# Patient Record
Sex: FEMALE | Race: ASIAN | NOT HISPANIC OR LATINO | URBAN - METROPOLITAN AREA
[De-identification: names, ages, dates, MRNs, and addresses within clinical notes are randomized per-mention and may not be internally consistent; named-entity substitution may affect disease eponyms.]

---

## 2022-02-10 ENCOUNTER — OFFICE VISIT (OUTPATIENT)
Dept: URBAN - METROPOLITAN AREA CLINIC 78 | Facility: CLINIC | Age: 62
End: 2022-02-10
Payer: COMMERCIAL

## 2022-02-10 ENCOUNTER — LAB OUTSIDE AN ENCOUNTER (OUTPATIENT)
Dept: URBAN - METROPOLITAN AREA CLINIC 78 | Facility: CLINIC | Age: 62
End: 2022-02-10

## 2022-02-10 VITALS
HEART RATE: 63 BPM | BODY MASS INDEX: 17.75 KG/M2 | SYSTOLIC BLOOD PRESSURE: 121 MMHG | HEIGHT: 64 IN | TEMPERATURE: 97.9 F | DIASTOLIC BLOOD PRESSURE: 73 MMHG | WEIGHT: 104 LBS

## 2022-02-10 DIAGNOSIS — R68.81 EARLY SATIETY: ICD-10-CM

## 2022-02-10 DIAGNOSIS — K30 INDIGESTION: ICD-10-CM

## 2022-02-10 DIAGNOSIS — K58.1 IRRITABLE BOWEL SYNDROME WITH CONSTIPATION: ICD-10-CM

## 2022-02-10 PROCEDURE — 3017F COLORECTAL CA SCREEN DOC REV: CPT | Performed by: INTERNAL MEDICINE

## 2022-02-10 PROCEDURE — G8427 DOCREV CUR MEDS BY ELIG CLIN: HCPCS | Performed by: INTERNAL MEDICINE

## 2022-02-10 PROCEDURE — 1036F TOBACCO NON-USER: CPT | Performed by: INTERNAL MEDICINE

## 2022-02-10 PROCEDURE — 99204 OFFICE O/P NEW MOD 45 MIN: CPT | Performed by: INTERNAL MEDICINE

## 2022-02-10 PROCEDURE — G8420 CALC BMI NORM PARAMETERS: HCPCS | Performed by: INTERNAL MEDICINE

## 2022-02-10 PROCEDURE — G9903 PT SCRN TBCO ID AS NON USER: HCPCS | Performed by: INTERNAL MEDICINE

## 2022-02-10 PROCEDURE — G9622 NO UNHEAL ETOH USER: HCPCS | Performed by: INTERNAL MEDICINE

## 2022-02-10 RX ORDER — MIRTAZAPINE 15 MG/1
1 TABLET AT BEDTIME TABLET, FILM COATED ORAL ONCE A DAY
Qty: 30 | Refills: 2 | OUTPATIENT
Start: 2022-02-10

## 2022-02-10 RX ORDER — OMEPRAZOLE 40 MG/1
1 CAPSULE 30 MINUTES BEFORE MORNING MEAL CAPSULE, DELAYED RELEASE ORAL ONCE A DAY
Status: ACTIVE | COMMUNITY

## 2022-02-10 RX ORDER — ROSUVASTATIN CALCIUM 5 MG/1
1 TABLET TABLET, FILM COATED ORAL ONCE A DAY
Status: ACTIVE | COMMUNITY

## 2022-02-10 NOTE — PREVIOUS WORKUP REVIEWED
.ENDOSCOPIES-EGD 7/21/2021: Indication of epigastric pain.  Localized erythema over the mucosa in the duodenal bulb.  Otherwise normal EGD.-Colonoscopy 7/21/2021: Normal TI and colon. LABSIMAGES-MRI abdomen with and without contrast 3/15/2021: Indication epigastric, lower back pain.  History of hepatic cysts.  Scattered simple appearing cysts in the liver.  Largest 1/2.6 cm.  No worrisome lesion.  No significant fat deposition.  S/p cholecystectomy.  Normal bile duct.  No evidence of choledocholithiasis.  Normal pancreas.  Status post left nephrectomy.  1.1 cm cyst in the right-sided kidney.  Colonic fecal loading.  Patent abdominal vasculature.

## 2022-02-10 NOTE — HPI-TODAY'S VISIT:
61-year-old [place] American female presents for 7 years a history of indigestion, early satiety, chest/epigastric/upper back pain postprandially, weight loss.  She had comprehensive work-up with Dr. Tru Levy including EGD, colonoscopy, MRI.  Unremarkable. She moves bowel every other day.  MRI showed increase in stool load.  She does not like taking medication. History of H. pylori, treated.  This time H. pylori was negative per patient's report. She has been on various PPI, currently omeprazole.  Typically works initially then slowly become ineffective.

## 2022-03-08 ENCOUNTER — OFFICE VISIT (OUTPATIENT)
Dept: URBAN - METROPOLITAN AREA CLINIC 78 | Facility: CLINIC | Age: 62
End: 2022-03-08
Payer: COMMERCIAL

## 2022-03-08 VITALS
DIASTOLIC BLOOD PRESSURE: 73 MMHG | TEMPERATURE: 97.6 F | SYSTOLIC BLOOD PRESSURE: 110 MMHG | HEART RATE: 68 BPM | WEIGHT: 104 LBS | HEIGHT: 64 IN | BODY MASS INDEX: 17.75 KG/M2

## 2022-03-08 DIAGNOSIS — R68.81 EARLY SATIETY: ICD-10-CM

## 2022-03-08 DIAGNOSIS — K58.1 IRRITABLE BOWEL SYNDROME WITH CONSTIPATION: ICD-10-CM

## 2022-03-08 DIAGNOSIS — R10.13 ABDOMINAL PAIN, EPIGASTRIC: ICD-10-CM

## 2022-03-08 PROCEDURE — G8427 DOCREV CUR MEDS BY ELIG CLIN: HCPCS | Performed by: INTERNAL MEDICINE

## 2022-03-08 PROCEDURE — 1036F TOBACCO NON-USER: CPT | Performed by: INTERNAL MEDICINE

## 2022-03-08 PROCEDURE — G9622 NO UNHEAL ETOH USER: HCPCS | Performed by: INTERNAL MEDICINE

## 2022-03-08 PROCEDURE — 99214 OFFICE O/P EST MOD 30 MIN: CPT | Performed by: INTERNAL MEDICINE

## 2022-03-08 PROCEDURE — 3017F COLORECTAL CA SCREEN DOC REV: CPT | Performed by: INTERNAL MEDICINE

## 2022-03-08 PROCEDURE — G8420 CALC BMI NORM PARAMETERS: HCPCS | Performed by: INTERNAL MEDICINE

## 2022-03-08 RX ORDER — MIRTAZAPINE 15 MG/1
1 TABLET AT BEDTIME TABLET, FILM COATED ORAL ONCE A DAY
Qty: 30 | Refills: 2 | Status: ACTIVE | COMMUNITY
Start: 2022-02-10

## 2022-03-08 RX ORDER — OMEPRAZOLE 40 MG/1
1 CAPSULE 30 MINUTES BEFORE MORNING MEAL CAPSULE, DELAYED RELEASE ORAL ONCE A DAY
Status: ACTIVE | COMMUNITY

## 2022-03-08 RX ORDER — METOCLOPRAMIDE HYDROCHLORIDE 5 MG/1
1 TABLET BEFORE MEALS AND BEDTIME TABLET ORAL
Qty: 56 | Refills: 2 | OUTPATIENT

## 2022-03-08 RX ORDER — ROSUVASTATIN CALCIUM 5 MG/1
1 TABLET TABLET, FILM COATED ORAL ONCE A DAY
Status: ACTIVE | COMMUNITY

## 2022-03-08 NOTE — HPI-TODAY'S VISIT:
61-year-old female presents for follow-up of [place] satiety, chronic indigestion, IBS-C.  She has been taking MiraLAX daily, " a little bit".  She most bowel every day, small amount.  [place] Stool Scale 1 or 4. She did not have gastric emptying study done with concern for radiation exposure. She did not take Remeron with concern for side effect. She does not want to try " antidepressant" or getting past with x-ray. Her pancreatic enzyme, stool elastase last year was normal.

## 2022-03-08 NOTE — PREVIOUS WORKUP REVIEWED
. , .ENDOSCOPIES-EGD 7/21/2021: Localized erythema of the mucosa in the duodenal bulb. 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 No dysplasia. Negative for H. pylori. LABSIMAGES-MRI abdomen with and without contrast 3/15/2021: Indication epigastric, lower back pain. History of hepatic cysts. Scattered simple appearing cysts in the liver. Largest 1/2.6 cm. No worrisome lesion. No significant fat deposition. S/p cholecystectomy. Normal bile duct. No evidence of choledocholithiasis. Normal pancreas. Status post left nephrectomy. 1.1 cm cyst in the right-sided kidney. Colonic fecal loading. Patent abdominal vasculature.

## 2022-03-31 ENCOUNTER — APPOINTMENT (RX ONLY)
Dept: URBAN - METROPOLITAN AREA CLINIC 45 | Facility: CLINIC | Age: 62
Setting detail: DERMATOLOGY
End: 2022-03-31

## 2022-03-31 DIAGNOSIS — L72.8 OTHER FOLLICULAR CYSTS OF THE SKIN AND SUBCUTANEOUS TISSUE: ICD-10-CM | Status: INADEQUATELY CONTROLLED

## 2022-03-31 DIAGNOSIS — D22 MELANOCYTIC NEVI: ICD-10-CM

## 2022-03-31 PROBLEM — D22.62 MELANOCYTIC NEVI OF LEFT UPPER LIMB, INCLUDING SHOULDER: Status: ACTIVE | Noted: 2022-03-31

## 2022-03-31 PROCEDURE — ? FULL BODY SKIN EXAM - DECLINED

## 2022-03-31 PROCEDURE — ? OBSERVATION AND MEASURE

## 2022-03-31 PROCEDURE — ? COUNSELING

## 2022-03-31 PROCEDURE — ? ADDITIONAL NOTES

## 2022-03-31 PROCEDURE — 99202 OFFICE O/P NEW SF 15 MIN: CPT

## 2022-03-31 ASSESSMENT — LOCATION SIMPLE DESCRIPTION DERM
LOCATION SIMPLE: LEFT CHEEK
LOCATION SIMPLE: LEFT ELBOW

## 2022-03-31 ASSESSMENT — LOCATION DETAILED DESCRIPTION DERM
LOCATION DETAILED: LEFT INFERIOR CENTRAL MALAR CHEEK
LOCATION DETAILED: LEFT ELBOW

## 2022-03-31 ASSESSMENT — LOCATION ZONE DERM
LOCATION ZONE: ARM
LOCATION ZONE: FACE

## 2022-03-31 NOTE — HPI: SKIN LESION
What Type Of Note Output Would You Prefer (Optional)?: Bullet Format
How Severe Is Your Skin Lesion?: mild
Has Your Skin Lesion Been Treated?: not been treated
Is This A New Presentation, Or A Follow-Up?: Growth
Additional History: Pt has a skin lesion on her left cheek, mentioned she has gotten it drained before but not removed.. \\nnew pt to PMM

## 2022-03-31 NOTE — PROCEDURE: ADDITIONAL NOTES
Render Risk Assessment In Note?: no
Detail Level: Simple
Additional Notes: Pt was referred to Dr Antonina gonzalez , plastic surgeon to get evaluated for the scarring on the cyst etc.
Additional Notes: Patient consent was obtained to proceed with the visit and recommended plan of care after discussion of all risks and benefits, including the risks of COVID-19 exposure.

## 2022-04-19 ENCOUNTER — OFFICE VISIT (OUTPATIENT)
Dept: URBAN - METROPOLITAN AREA CLINIC 78 | Facility: CLINIC | Age: 62
End: 2022-04-19
Payer: COMMERCIAL

## 2022-04-19 ENCOUNTER — DASHBOARD ENCOUNTERS (OUTPATIENT)
Age: 62
End: 2022-04-19

## 2022-04-19 VITALS
TEMPERATURE: 98.4 F | HEART RATE: 62 BPM | HEIGHT: 64 IN | BODY MASS INDEX: 17.75 KG/M2 | SYSTOLIC BLOOD PRESSURE: 123 MMHG | RESPIRATION RATE: 16 BRPM | WEIGHT: 104 LBS | DIASTOLIC BLOOD PRESSURE: 74 MMHG

## 2022-04-19 DIAGNOSIS — R68.81 EARLY SATIETY: ICD-10-CM

## 2022-04-19 DIAGNOSIS — K58.1 IRRITABLE BOWEL SYNDROME WITH CONSTIPATION: ICD-10-CM

## 2022-04-19 DIAGNOSIS — K30 INDIGESTION: ICD-10-CM

## 2022-04-19 PROBLEM — 440630006: Status: ACTIVE | Noted: 2022-02-10

## 2022-04-19 PROBLEM — 3696007: Status: ACTIVE | Noted: 2022-02-10

## 2022-04-19 PROBLEM — 162031009: Status: ACTIVE | Noted: 2022-02-10

## 2022-04-19 PROCEDURE — G9622 NO UNHEAL ETOH USER: HCPCS | Performed by: INTERNAL MEDICINE

## 2022-04-19 PROCEDURE — G8427 DOCREV CUR MEDS BY ELIG CLIN: HCPCS | Performed by: INTERNAL MEDICINE

## 2022-04-19 PROCEDURE — G8420 CALC BMI NORM PARAMETERS: HCPCS | Performed by: INTERNAL MEDICINE

## 2022-04-19 PROCEDURE — G9903 PT SCRN TBCO ID AS NON USER: HCPCS | Performed by: INTERNAL MEDICINE

## 2022-04-19 PROCEDURE — 99214 OFFICE O/P EST MOD 30 MIN: CPT | Performed by: INTERNAL MEDICINE

## 2022-04-19 PROCEDURE — 3017F COLORECTAL CA SCREEN DOC REV: CPT | Performed by: INTERNAL MEDICINE

## 2022-04-19 PROCEDURE — 1036F TOBACCO NON-USER: CPT | Performed by: INTERNAL MEDICINE

## 2022-04-19 RX ORDER — ROSUVASTATIN CALCIUM 5 MG/1
1 TABLET TABLET, FILM COATED ORAL ONCE A DAY
Status: ACTIVE | COMMUNITY

## 2022-04-19 NOTE — HPI-TODAY'S VISIT:
61-year-old female presents for follow-up.  At last visit 3/8/2022, gastric emptying study was recommended but she refused.  Instead she wanted to try Reglan.  She reports Reglan has helped.  But she does not want to take it chronically.  She is requesting "digestive enzymes and its test".  She has Creon at home.  She does not take it. She also complains of musculoskeletal pain.  All over. She does not take MiraLAX anymore.  She reports she moves bowel pretty well.  She is eating blended potato for constipation which works great. Patient had labs done with primary care provider.  Unremarkable.

## 2022-04-19 NOTE — PREVIOUS WORKUP REVIEWED
. , . , . ENDOSCOPIES-EGD 7/21/2021: Localized erythema of the mucosa in the duodenal bulb. 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 No dysplasia. Negative for H. pylori. LABS -Labs 4/11/2022: Glucose 83, BUN 13, creatinine 0.72, sodium 141, potassium 4.2, total protein 7.4, albumin 4.6, total bilirubin 0.9, alkaline phosphatase 28, AST 20, ALT 17, hemoglobin A1c 5.5.  TSH 1.16, WBC 3.6, hemoglobin 12.3, platelet 151. -Labs 6/24/2021:Pancreatic elastase, >500. IMAGES -MRI abdomen with and without contrast 3/15/2021: Indication epigastric, lower back pain. History of hepatic cysts. Scattered simple appearing cysts in the liver. Largest 1/2.6 cm. No worrisome lesion. No significant fat deposition. S/p cholecystectomy. Normal bile duct. No evidence of choledocholithiasis. Normal pancreas. Status post left nephrectomy. 1.1 cm cyst in the right-sided kidney. Colonic fecal loading. Patent abdominal vasculature.

## 2022-07-21 ENCOUNTER — APPOINTMENT (RX ONLY)
Dept: URBAN - METROPOLITAN AREA CLINIC 45 | Facility: CLINIC | Age: 62
Setting detail: DERMATOLOGY
End: 2022-07-21

## 2022-07-21 DIAGNOSIS — L72.8 OTHER FOLLICULAR CYSTS OF THE SKIN AND SUBCUTANEOUS TISSUE: ICD-10-CM | Status: INADEQUATELY CONTROLLED

## 2022-07-21 PROCEDURE — ? ADDITIONAL NOTES

## 2022-07-21 PROCEDURE — ? COUNSELING

## 2022-07-21 PROCEDURE — ? PRESCRIPTION

## 2022-07-21 PROCEDURE — ? PRESCRIPTION MEDICATION MANAGEMENT

## 2022-07-21 PROCEDURE — 99214 OFFICE O/P EST MOD 30 MIN: CPT

## 2022-07-21 RX ORDER — MUPIROCIN 20 MG/G
OINTMENT TOPICAL BID
Qty: 22 | Refills: 2 | Status: ERX | COMMUNITY
Start: 2022-07-21

## 2022-07-21 RX ADMIN — MUPIROCIN: 20 OINTMENT TOPICAL at 00:00

## 2022-07-21 ASSESSMENT — LOCATION DETAILED DESCRIPTION DERM
LOCATION DETAILED: LEFT INFERIOR CENTRAL MALAR CHEEK
LOCATION DETAILED: LEFT CENTRAL MALAR CHEEK

## 2022-07-21 ASSESSMENT — LOCATION ZONE DERM: LOCATION ZONE: FACE

## 2022-07-21 ASSESSMENT — LOCATION SIMPLE DESCRIPTION DERM: LOCATION SIMPLE: LEFT CHEEK

## 2022-07-21 NOTE — PROCEDURE: PRESCRIPTION MEDICATION MANAGEMENT
Initiate Treatment: Mupirocin ointment 2% to lesion qd until healed
Render In Strict Bullet Format?: No
Plan: Referred to Dr RICARDO Cruz for evaluation and treatment
Detail Level: Zone

## 2022-07-21 NOTE — HPI: CYST
How Severe Is Your Cyst?: mild
Is This A New Presentation, Or A Follow-Up?: Cyst
Additional History: Pt presents for f/up of cyst on left side of face. Dr. Naranjo plastic surgeon did not approve excision. Wants further recommendation. Last saw PMM in 03/2022.

## 2022-10-04 ENCOUNTER — APPOINTMENT (RX ONLY)
Dept: URBAN - METROPOLITAN AREA CLINIC 45 | Facility: CLINIC | Age: 62
Setting detail: DERMATOLOGY
End: 2022-10-04

## 2022-10-04 DIAGNOSIS — L81.4 OTHER MELANIN HYPERPIGMENTATION: ICD-10-CM | Status: INADEQUATELY CONTROLLED

## 2022-10-04 PROCEDURE — ? PRESCRIPTION MEDICATION MANAGEMENT

## 2022-10-04 PROCEDURE — ? ADDITIONAL NOTES

## 2022-10-04 PROCEDURE — 99214 OFFICE O/P EST MOD 30 MIN: CPT

## 2022-10-04 PROCEDURE — ? FULL BODY SKIN EXAM - DECLINED

## 2022-10-04 PROCEDURE — ? COUNSELING

## 2022-10-04 ASSESSMENT — LOCATION SIMPLE DESCRIPTION DERM: LOCATION SIMPLE: GLUTEAL CLEFT

## 2022-10-04 ASSESSMENT — LOCATION DETAILED DESCRIPTION DERM: LOCATION DETAILED: GLUTEAL CLEFT

## 2022-10-04 ASSESSMENT — LOCATION ZONE DERM: LOCATION ZONE: TRUNK

## 2022-10-04 NOTE — PROCEDURE: PRESCRIPTION MEDICATION MANAGEMENT
Discontinue Regimen: Betamethasone 0.05% cream
Detail Level: Zone
Render In Strict Bullet Format?: No
Plan: Advised to continue using otc moisturizer

## 2022-10-04 NOTE — HPI: RASH
What Type Of Note Output Would You Prefer (Optional)?: Bullet Format
How Severe Is Your Rash?: mild
Is This A New Presentation, Or A Follow-Up?: Rash
Additional History: Last seen by PMM 06/2022 \\nPt was using ’s betamethasone 0.05% cream with no response.

## 2022-10-04 NOTE — PROCEDURE: ADDITIONAL NOTES
Detail Level: Simple
Additional Notes: Patient consent was obtained to proceed with the visit and recommended plan of care after discussion of all risks and benefits, including the risks of COVID-19 exposure.
Additional Notes: Discussed this is not active rash, likely PIH from previous rash. Asymptomatic, not bothersome. May be related to friction in area.
Render Risk Assessment In Note?: no

## 2025-01-29 ENCOUNTER — OFFICE VISIT (OUTPATIENT)
Dept: URBAN - METROPOLITAN AREA CLINIC 78 | Facility: CLINIC | Age: 65
End: 2025-01-29

## 2025-06-04 ENCOUNTER — OFFICE VISIT (OUTPATIENT)
Dept: URBAN - METROPOLITAN AREA CLINIC 78 | Facility: CLINIC | Age: 65
End: 2025-06-04